# Patient Record
Sex: FEMALE | Race: WHITE | NOT HISPANIC OR LATINO
[De-identification: names, ages, dates, MRNs, and addresses within clinical notes are randomized per-mention and may not be internally consistent; named-entity substitution may affect disease eponyms.]

---

## 2017-01-30 ENCOUNTER — TRANSCRIPTION ENCOUNTER (OUTPATIENT)
Age: 32
End: 2017-01-30

## 2017-12-29 ENCOUNTER — TRANSCRIPTION ENCOUNTER (OUTPATIENT)
Age: 32
End: 2017-12-29

## 2018-07-03 ENCOUNTER — TRANSCRIPTION ENCOUNTER (OUTPATIENT)
Age: 33
End: 2018-07-03

## 2020-11-24 ENCOUNTER — EMERGENCY (EMERGENCY)
Facility: HOSPITAL | Age: 35
LOS: 0 days | Discharge: HOME | End: 2020-11-24
Attending: EMERGENCY MEDICINE
Payer: COMMERCIAL

## 2020-11-24 VITALS
TEMPERATURE: 99 F | HEART RATE: 78 BPM | SYSTOLIC BLOOD PRESSURE: 131 MMHG | DIASTOLIC BLOOD PRESSURE: 87 MMHG | RESPIRATION RATE: 18 BRPM | OXYGEN SATURATION: 99 %

## 2020-11-24 VITALS — HEIGHT: 63 IN | WEIGHT: 119.93 LBS
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
## 2020-11-24 DIAGNOSIS — M79.672 PAIN IN LEFT FOOT: ICD-10-CM
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
## 2020-11-24 DIAGNOSIS — S92.355A NONDISPLACED FRACTURE OF FIFTH METATARSAL BONE, LEFT FOOT, INITIAL ENCOUNTER FOR CLOSED FRACTURE: ICD-10-CM
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
## 2020-11-24 PROCEDURE — 73630 X-RAY EXAM OF FOOT: CPT | Mod: 26,LT

## 2020-11-24 PROCEDURE — 99284 EMERGENCY DEPT VISIT MOD MDM: CPT

## 2020-11-24 PROCEDURE — 29515 APPLICATION SHORT LEG SPLINT: CPT | Mod: LT

## 2020-11-24 PROCEDURE — 99284 EMERGENCY DEPT VISIT MOD MDM: CPT | Mod: 25

## 2020-11-24 PROCEDURE — 73630 X-RAY EXAM OF FOOT: CPT | Mod: LT

## 2020-11-24 RX ORDER — IBUPROFEN 200 MG
1 TABLET ORAL
Qty: 21 | Refills: 0
Start: 2020-11-24 | End: 2020-11-30

## 2020-11-24 RX ORDER — OXYCODONE HYDROCHLORIDE 5 MG/1
1 TABLET ORAL
Qty: 5 | Refills: 0
Start: 2020-11-24 | End: 2020-11-28

## 2020-11-24 RX ORDER — ASPIRIN/CALCIUM CARB/MAGNESIUM 324 MG
1 TABLET ORAL
Qty: 10 | Refills: 0
Start: 2020-11-24 | End: 2020-12-03

## 2020-11-24 NOTE — ED STATDOCS - CLINICAL SUMMARY MEDICAL DECISION MAKING FREE TEXT BOX
36 y/o female p/w left foot injury. PLAN: X-rays 36 y/o female p/w left foot injury. PLAN: X-rays    Splint applied by podiatry team and crutches provided.  Prophylax 325 mg ASA daily x 10 days.  Diane Zambrano PA-C

## 2020-11-24 NOTE — ED STATDOCS - ATTENDING CONTRIBUTION TO CARE
I, Molina Orellana MD,  performed the initial face to face bedside interview with this patient regarding history of present illness, review of symptoms and relevant past medical, social and family history.  I completed an independent physical examination.  I was the initial provider who evaluated this patient. I have signed out the follow up of any pending tests (i.e. labs, radiological studies) to the ACP.  I have communicated the patient’s plan of care and disposition with the ACP.  The history, relevant review of systems, past medical and surgical history, medical decision making, and physical examination was documented by the scribe in my presence and I attest to the accuracy of the documentation.

## 2020-11-24 NOTE — ED STATDOCS - NSFOLLOWUPINSTRUCTIONS_ED_ALL_ED_FT
Foot Fracture in Adults    WHAT YOU NEED TO KNOW:    What do I need to know about a foot fracture? A foot fracture is a break in a bone in your foot.    Foot Anatomy         What are the signs and symptoms of a foot fracture?   •Tenderness over the injured area      •Foot pain that increases when you try to stand or walk      •Numbness in your foot or toes      •Cracking sounds when you move your foot      •Swelling, bruising, blistering, or open skin breaks      •Trouble moving your foot or walking      •Foot shape that is not normal      How is a foot fracture diagnosed? Your healthcare provider will examine your foot and check for decreased feeling. He or she will check for any open skin breaks. He or she may check your foot movement. You may need any of the following tests:  •An x-ray, CT scan, or MRI may be used to check for a broken bone or other injury. Contrast liquid may be used to help your foot show up better in the pictures. Tell the healthcare provider if you have ever had an allergic reaction to contrast liquid. Do not enter the MRI room with anything metal. Metal can cause serious injury. Tell the healthcare provider if you have any metal in or on your body.      •A bone scan may be used to check for a broken bone. You will be given a small amount of radioactive dye in an IV. Pictures will then be taken of your foot.      How is a foot fracture treated? Treatment depends on the kind of fracture you have and how bad it is. You may need any of the following:  •A boot, cast, or splint may be put on your foot and lower leg to decrease your foot movement. These work to hold the broken bones in place, decrease pain, and prevent more damage to your foot.      •Medicines may be given to prevent or treat pain or a bacterial infection. You may also need a vaccine to prevent tetanus if bone broke through the skin. A tetanus shot is given if you have not had a booster in the past 5 to 10 years.      •Surgery may be used to put your bones back into the correct position. Wires, pins, plates, or screws may be used to keep the broken pieces lined up correctly and hold them together.      What can I do to help my foot heal?   •Rest your foot and avoid activities that cause pain.      •Apply ice to decrease swelling and pain, and to prevent tissue damage. Use an ice pack, or put crushed ice in a plastic bag. Cover it with a towel before you apply it. Use ice for 15 to 20 minutes every hour or as directed.      •Elevate your foot above the level of your heart as often as you can. This will help decrease swelling and pain. Prop your foot on pillows or blankets to keep it elevated comfortably.  Elevate Leg           •Physical therapy may be needed when your foot has healed. A physical therapist can teach you exercises to help improve movement and strength, and to decrease pain.      Call your local emergency number (911 in the [place]) if:   •You suddenly feel lightheaded and short of breath.      •You have chest pain when you take a deep breath or cough.      •You cough up blood.      When should I seek immediate care?   •The pain in your injured foot gets worse even after you rest and take pain medicine.      •The skin or toes of your foot become numb, swollen, cold, white, or blue.      •You have more pain or swelling than you did before a cast was put on.      •Your leg feels warm, tender, and painful. It may look swollen and red.      When should I call my doctor?   •You have a fever.      •You have new sores around your boot, cast, or splint.      •You have new or worsening trouble moving your foot.      •You notice a foul smell coming from under your cast.      •Your boot, cast, or splint gets damaged.      •You have questions or concerns about your condition or care.      CARE AGREEMENT:    You have the right to help plan your care. Learn about your health condition and how it may be treated. Discuss treatment options with your healthcare providers to decide what care you want to receive. You always have the right to refuse treatment.

## 2020-11-24 NOTE — ED STATDOCS - PROGRESS NOTE DETAILS
Pt. is a 37 year old female presenting with left foot pain since last night.  Pt. states she was walking and stepped on uneven pavement and twisted foot.  Pain worse today.  Unable to bear weight secondary to pain.  Neg. other injuries reported.   TTP over left fifth metatarsal at base.  Diane Zambrano PA-C Fracture to base of fifth metatarsal.  Will consult podiatry.  Diane Zambrano PA-C Splint applied by podiatry team and crutches provided.  Prophylax 325 mg ASA daily x 10 days.  Diane Zambrano PA-C Splint applied by podiatry team (Fidencio) and crutches provided.  Prophylax 325 mg ASA daily x 10 days.  Diane Zambrano PA-C

## 2020-11-24 NOTE — ED STATDOCS - PATIENT PORTAL LINK FT
You can access the FollowMyHealth Patient Portal offered by [facility] by registering at the following website: [email or web address]. By joining [facility]’s FollowMyHealth portal, you will also be able to view your health information using other applications (apps) compatible with our system.

## 2020-11-24 NOTE — ED STATDOCS - OBJECTIVE STATEMENT
34 y/o female with no pertinent PMHx presents to the ED c/o foot pain. Pt was taking daughter to dance class last night, stepped on uneven surface with left foot and sprained left foot. Denies pain to left thigh, or ankle. LMP: 1.5 weeks ago. No history of smoking, no illicit drug use. No history of COVID-19. No recent travels. No other injuries or complaints at this time. PCP: Arsen.

## 2020-11-24 NOTE — ED STATDOCS - CARE PROVIDER_API CALL
Ladan Rice  PODIATRIC MEDICINE AND SURGERY  [address]  [place] [address]  Phone: [phone]  Fax: [phone]  Follow Up Time:

## 2020-11-24 NOTE — ED STATDOCS - MUSCULOSKELETAL, MLM
TTP Mid left foot laterally, with associated ecchymosis and swelling. Motor and sensations intact, distal sensations intact, FROM intact, neurovascularly intact. TTP Mid left foot laterally, with associated ecchymosis and swelling. Motor and sensations intact distally

## 2023-10-02 PROBLEM — Z78.9 OTHER SPECIFIED HEALTH STATUS: Chronic | Status: ACTIVE | Noted: 2020-11-24

## 2024-04-11 DIAGNOSIS — Z80.9 FAMILY HISTORY OF MALIGNANT NEOPLASM, UNSPECIFIED: ICD-10-CM

## 2024-04-19 ENCOUNTER — APPOINTMENT (OUTPATIENT)
Dept: FAMILY MEDICINE | Facility: CLINIC | Age: 39
End: 2024-04-19

## 2024-04-19 DIAGNOSIS — K51.80 OTHER ULCERATIVE COLITIS W/OUT COMPLICATIONS: ICD-10-CM

## 2024-04-19 DIAGNOSIS — Z00.00 ENCOUNTER FOR GENERAL ADULT MEDICAL EXAMINATION W/OUT ABNORMAL FINDINGS: ICD-10-CM
